# Patient Record
Sex: MALE | Race: WHITE | NOT HISPANIC OR LATINO | URBAN - METROPOLITAN AREA
[De-identification: names, ages, dates, MRNs, and addresses within clinical notes are randomized per-mention and may not be internally consistent; named-entity substitution may affect disease eponyms.]

---

## 2018-11-07 ENCOUNTER — OFFICE VISIT (OUTPATIENT)
Dept: URBAN - METROPOLITAN AREA CLINIC 32 | Facility: CLINIC | Age: 53
End: 2018-11-07
Payer: COMMERCIAL

## 2018-11-07 DIAGNOSIS — H10.413 CONJUNCTIVITIS - ALLERGIC: ICD-10-CM

## 2018-11-07 PROCEDURE — 99213 OFFICE O/P EST LOW 20 MIN: CPT | Performed by: OPTOMETRIST

## 2018-11-07 PROCEDURE — V2624 POLISHING ARTIFICAL EYE: HCPCS | Performed by: OPTOMETRIST

## 2018-11-07 RX ORDER — LORATADINE 5 MG/5 ML
SOLUTION, ORAL ORAL
Qty: 0 | Refills: 0 | Status: ACTIVE
Start: 2018-11-07

## 2018-12-06 ENCOUNTER — OFFICE VISIT (OUTPATIENT)
Dept: URBAN - METROPOLITAN AREA CLINIC 32 | Facility: CLINIC | Age: 53
End: 2018-12-06
Payer: COMMERCIAL

## 2018-12-06 PROCEDURE — 92012 INTRM OPH EXAM EST PATIENT: CPT | Performed by: OPHTHALMOLOGY

## 2018-12-06 NOTE — IMPRESSION/PLAN
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 Plan: Discussed diagnosis in detail with patient. Discussed treatment options with patient. Prosthesis is not supported well by Right Lower Eyelid. Retain records form [facility] to evaluate previous eyelid procedures and or surgeries to determine next step. Consider amniotic membrane graft to inferior fornix VS. tarsus to RLL.

## 2019-01-17 ENCOUNTER — OFFICE VISIT (OUTPATIENT)
Dept: URBAN - METROPOLITAN AREA CLINIC 32 | Facility: CLINIC | Age: 54
End: 2019-01-17
Payer: COMMERCIAL

## 2019-01-17 DIAGNOSIS — H02.532 EYELID RETRACTION RIGHT LOWER EYELID: Chronic | ICD-10-CM

## 2019-01-17 DIAGNOSIS — Q11.1 OTHER ANOPHTHALMOS: Primary | Chronic | ICD-10-CM

## 2019-01-17 PROCEDURE — 92012 INTRM OPH EXAM EST PATIENT: CPT | Performed by: OPHTHALMOLOGY

## 2019-01-17 PROCEDURE — 92285 EXTERNAL OCULAR PHOTOGRAPHY: CPT | Performed by: OPHTHALMOLOGY

## 2019-01-17 NOTE — IMPRESSION/PLAN
Impression: Eyelid retraction right lower eyelid: H02.532. OD. Condition: established, stable. Symptoms: may improve with surgery. Plan: Discussed diagnosis in detail with patient. Discussed treatment options with patient. Surgical treatment is recommended. Surgical risks and benefits were discussed, explained and understood by patient. Patient elects to have surgery. Recommend right lower eyelid release of scar tissue with placement of tarsus graft. RL2, not on any blood thinners. Leave prosthesis in for surgery.